# Patient Record
(demographics unavailable — no encounter records)

---

## 2024-10-21 NOTE — HEALTH RISK ASSESSMENT
[Very Good] : ~his/her~  mood as very good [Yes] : Yes [2 - 4 times a month (2 pts)] : 2-4 times a month (2 points) [Never (0 pts)] : Never (0 points) [No falls in past year] : Patient reported no falls in the past year [0] : 2) Feeling down, depressed, or hopeless: Not at all (0) [PHQ-2 Negative - No further assessment needed] : PHQ-2 Negative - No further assessment needed [[id number]] : 0 [Never] : Never [NO] : No [Change in mental status noted] : No change in mental status noted [Language] : denies difficulty with language [Behavior] : denies difficulty with behavior [Learning/Retaining New Information] : denies difficulty learning/retaining new information [Handling Complex Tasks] : denies difficulty handling complex tasks [Reasoning] : denies difficulty with reasoning [None] : None [With Family] : lives with family [Employed] : employed [High School] : high school [] :  [Feels Safe at Home] : Feels safe at home [Fully functional (bathing, dressing, toileting, transferring, walking, feeding)] : Fully functional (bathing, dressing, toileting, transferring, walking, feeding) [Fully functional (using the telephone, shopping, preparing meals, housekeeping, doing laundry, using] : Fully functional and needs no help or supervision to perform IADLs (using the telephone, shopping, preparing meals, housekeeping, doing laundry, using transportation, managing medications and managing finances) [Reports changes in hearing] : Reports no changes in hearing [Reports changes in vision] : Reports no changes in vision [Reports changes in dental health] : Reports no changes in dental health [Smoke Detector] : smoke detector [Carbon Monoxide Detector] : carbon monoxide detector [Seat Belt] :  uses seat belt

## 2024-10-21 NOTE — HISTORY OF PRESENT ILLNESS
[FreeTextEntry1] : Annual [de-identified] : Annual Needs colon decline flu On no med some low back issue and shoulder issue otherwise well

## 2025-02-06 NOTE — DISCUSSION/SUMMARY
[Medication Risks Reviewed] : Medication risks reviewed [de-identified] :  Plan for MRI of the R knee to r/o MMT Rx: Naproxen f/u after MRI ----------------------------------------------------------------------------   All relevant imaging studies pertinent to today's visit, including x-rays, MRI's and/or other advanced imaging studies (CT/etc) were independently interpreted and reviewed with the patient as needed. Implications of the studies together with the patient's clinical picture were discussed to formulate a working diagnosis and management options were detailed.   The patient and/or guardian was advised of the diagnosis.  The natural history of the pathology was explained in full. All questions were answered.  The risks and benefits of conservative and interventional treatment alternatives were explained to the patient   The patient and/or guardian was advised if any advanced diagnostic/imaging study (MRI/CT/etc) is ordered to evaluate potential pathology in the affected area(s), they should follow up in the office to review the results of the study and determine further management that may be indicated.     ----------------------------------------------------------------------------   Patient warned of specific risks of medication related to bleeding, GI issues, increase blood pressure, and cardiac risks in addition to additional risks.  Patient advised to discuss with PMD  if any presence of stated issues.

## 2025-02-06 NOTE — HISTORY OF PRESENT ILLNESS
[Sudden] : sudden [8] : 8 [Sharp] : sharp [Frequent] : frequent [de-identified] : This is Mr. MAIRA MARINO  a 63 year old male who comes in today complaining of right knee pain since a pickle ball incident on Monday.  stopped short. knee swelled the following day. Also noted in the summer had an incident of knee pain when pedaling the bike [] : no [FreeTextEntry3] : 2/3/25

## 2025-02-27 NOTE — HISTORY OF PRESENT ILLNESS
[Sudden] : sudden [2] : 2 [de-identified] : This is Mr. MAIRA MARINO  a 63 year old male who comes in today complaining of right knee pain since a pickle ball incident on Monday.  stopped short. knee swelled the following day. Also noted in the summer had an incident of knee pain when pedaling the bike [] : no [FreeTextEntry3] : 2/3/25 [de-identified] : MRI

## 2025-02-27 NOTE — DATA REVIEWED
[MRI] : MRI [Right] : of the right [Knee] : knee [I independently reviewed and interpreted images and report] : I independently reviewed and interpreted images and report [FreeTextEntry1] : significant anterior horn lateral meniscus tearing, posterior horn lateral meniscus tearing, mod effusion, mild lateral compartment DJD, mod medial compartment DJD, medial meniscus free edge tearing, horizontal tearing posterior horn, PF chondromalacia

## 2025-02-27 NOTE — DISCUSSION/SUMMARY
[de-identified] :  Reviewed MRI of the R knee  Discussed treatment options for pt's meniscus tearing in the setting of OA  Discussed arthroscopy and conservative measures but pt is only having mild PF symptoms  Plan for HEP- symptoms relatively mild at this point Discussed the importance of strengthening  ----------------------------------------------------------------------------   We discussed arthroscopic treatment in the setting of arthritis and degenerative joint disease. We discussed the indications, including mechanical symptoms related to meniscal pathology and advised that the arthroscopic procedure would help whatever portion of the patients symptoms are coming from the meniscal pathology only. We discussed pain coming from the underlying arthritis would be unlikely to benefit and therefore inferior surgical outcomes in this setting are not uncommon. We discussed possible postoperative need for further treatment including PT, viscosupplementation, cortisone injections, and in some cases arthroplasty.     ----------------------------------------------------------------------------   All relevant imaging studies pertinent to today's visit, including x-rays, MRI's and/or other advanced imaging studies (CT/etc) were independently interpreted and reviewed with the patient as needed. Implications of the studies together with the patient's clinical picture were discussed to formulate a working diagnosis and management options were detailed.   The patient and/or guardian was advised of the diagnosis.  The natural history of the pathology was explained in full. All questions were answered.  The risks and benefits of conservative and interventional treatment alternatives were explained to the patient   The patient and/or guardian was advised if any advanced diagnostic/imaging study (MRI/CT/etc) is ordered to evaluate potential pathology in the affected area(s), they should follow up in the office to review the results of the study and determine further management that may be indicated.     ----------------------------------------------------------------------------

## 2025-02-27 NOTE — IMAGING
[Right] : right knee [Moderate patellofemoral OA] : Moderate patellofemoral OA [de-identified] :   ----------------------------------------------------------------------------   Right knee exam:   Skin: no significant pertinent finding  Inspection:     (+) Effusion     (neg) Malalignment     (neg) Swelling     (neg) Quad atrophy     (neg) J-sign  ROM:     5 - 90 degrees of flexion.  Tenderness:      (+) MJLT     (neg) LJLT     (neg) Medial patellar facet tenderness     (neg) Lateral patellar facet tenderness     (+) Crepitus     (neg) Patellar grind tenderness     (neg) Patellar tendon     (neg) Quad tendon     Other: (+) posteromedial joint line tenderness  Stability:     (=) Lachman     (neg) Varus/Valgus instability     (neg) Posterior drawer     (neg) Patellar translation: wnl  Additional tests:     (=) McMurrays test     (neg) Patellar apprehension     Other:  Strength: 5/5 Q/H/TA/GS/EHL  Neuro: In tact to light touch throughout, DTR's wnl  Vascularity: Extremity warm and well perfused  Gait: antalgic gait     [FreeTextEntry9] : mild lateral OA, effusion